# Patient Record
Sex: FEMALE | Race: WHITE | Employment: UNEMPLOYED | URBAN - METROPOLITAN AREA
[De-identification: names, ages, dates, MRNs, and addresses within clinical notes are randomized per-mention and may not be internally consistent; named-entity substitution may affect disease eponyms.]

---

## 2021-10-24 ENCOUNTER — HOSPITAL ENCOUNTER (EMERGENCY)
Age: 41
Discharge: HOME | End: 2021-10-24
Attending: EMERGENCY MEDICINE
Payer: COMMERCIAL

## 2021-10-24 ENCOUNTER — APPOINTMENT (OUTPATIENT)
Dept: ULTRASOUND IMAGING | Age: 41
End: 2021-10-24
Attending: EMERGENCY MEDICINE
Payer: COMMERCIAL

## 2021-10-24 VITALS
HEART RATE: 57 BPM | SYSTOLIC BLOOD PRESSURE: 138 MMHG | HEIGHT: 64 IN | WEIGHT: 165.34 LBS | OXYGEN SATURATION: 99 % | RESPIRATION RATE: 16 BRPM | DIASTOLIC BLOOD PRESSURE: 72 MMHG | BODY MASS INDEX: 28.23 KG/M2 | TEMPERATURE: 98.5 F

## 2021-10-24 DIAGNOSIS — M71.22 SYNOVIAL CYST OF LEFT POPLITEAL SPACE: ICD-10-CM

## 2021-10-24 DIAGNOSIS — S76.312A STRAIN OF LEFT HAMSTRING, INITIAL ENCOUNTER: Primary | ICD-10-CM

## 2021-10-24 PROCEDURE — 93971 EXTREMITY STUDY: CPT

## 2021-10-24 PROCEDURE — 99282 EMERGENCY DEPT VISIT SF MDM: CPT

## 2021-10-24 NOTE — ED PROVIDER NOTES
43-year-old female presents with knee pain and pain to her left hamstring. She states that she had a tendon injury to her knee back in June and initially got better. She received the third Covid booster and flu shot on Monday and speech began having pain and some swelling to the left knee. She has had a little bit of tingling to the left foot. She is concerned about a blood clot. She denies any chest pain or shortness of breath. She has started the  soccer and is running a lot for that. Knee Pain     Foot Pain          Past Medical History:   Diagnosis Date    Anxiety     Thyroid disease     Hypothroid       History reviewed. No pertinent surgical history. History reviewed. No pertinent family history. Social History     Socioeconomic History    Marital status:      Spouse name: Not on file    Number of children: Not on file    Years of education: Not on file    Highest education level: Not on file   Occupational History    Not on file   Tobacco Use    Smoking status: Never Smoker    Smokeless tobacco: Never Used   Substance and Sexual Activity    Alcohol use: Yes     Alcohol/week: 0.0 standard drinks     Comment: Rarely.  Drug use: No    Sexual activity: Yes     Partners: Male   Other Topics Concern    Not on file   Social History Narrative    Not on file     Social Determinants of Health     Financial Resource Strain:     Difficulty of Paying Living Expenses:    Food Insecurity:     Worried About Running Out of Food in the Last Year:     [address] in the Last Year:    Transportation Needs:     Lack of Transportation (Medical):      Lack of Transportation (Non-Medical):    Physical Activity:     Days of Exercise per Week:     Minutes of Exercise per Session:    Stress:     Feeling of Stress :    Social Connections:     Frequency of Communication with Friends and Family:     Frequency of Social Gatherings with Friends and Family:     Attends Muslim Services:     Active Member of Clubs or Organizations:     Attends Club or Organization Meetings:     Marital Status:    Intimate Partner Violence:     Fear of Current or Ex-Partner:     Emotionally Abused:     Physically Abused:     Sexually Abused: ALLERGIES: Patient has no known allergies. Review of Systems   All other systems reviewed and are negative. Vitals:    10/24/21 1257   BP: 138/72   Pulse: (!) 57   Resp: 16   Temp: 98.5 °F (36.9 °C)   SpO2: 99%   Weight: 75 kg (165 lb 5.5 oz)   Height: 5' 4\" (1.626 m)            Physical Exam  Constitutional:       Appearance: She is well-developed. HENT:      Head: Normocephalic and atraumatic. Eyes:      General: No scleral icterus. Neck:      Trachea: No tracheal deviation. Cardiovascular:      Rate and Rhythm: Normal rate. Pulmonary:      Effort: Pulmonary effort is normal. No respiratory distress. Abdominal:      General: There is no distension. Genitourinary:     Comments: deferred  Musculoskeletal:         General: Tenderness (Left hamstring tendons) present. No deformity. Cervical back: No rigidity. Comments: Minimal left knee effusion   Skin:     General: Skin is dry. Neurological:      General: No focal deficit present. Mental Status: She is alert and oriented to person, place, and time. Motor: No weakness. Psychiatric:         Mood and Affect: Mood normal.         Behavior: Behavior normal.          MDM       Procedures      3:37 PM  Patient re-evaluated. All questions answered. Patient appropriate for discharge. Given return precautions and follow up instructions. LABORATORY TESTS:  Labs Reviewed - No data to display    IMAGING RESULTS:  DUPLEX LOWER EXT VENOUS LEFT   Final Result          MEDICATIONS GIVEN:  Medications - No data to display    IMPRESSION:  1. Strain of left hamstring, initial encounter    2. Synovial cyst of left popliteal space        PLAN:  1.    Current Discharge Medication List        2. Follow-up Information     Follow up With Specialties Details Why Contact Info    [facility] [address] [facility]     Hussain Trejo MD Orthopedic Surgery Schedule an appointment as soon as possible for a visit   [address] [facility]  [phone]      [address] [facility] DEPT Emergency Medicine  If symptoms worsen or new concerns [facility] [address] [facility]  [phone]        3. Return to ED for new or worsening symptoms       Missy Deng. Laura Elder MD        Please note that this dictation was completed with [employer], the computer voice recognition software. Quite often unanticipated grammatical, syntax, homophones, and other interpretive errors are inadvertently transcribed by the computer software. Please disregard these errors. Please excuse any errors that have escaped final proofreading.

## 2021-10-24 NOTE — DISCHARGE INSTRUCTIONS
Take 600 mg of ibuprofen every 6 hours as needed for pain and swelling. Ice the knee several times a day. Elevate the left lower extremity while resting. Gradually increase activity as tolerated. Follow-up with orthopedics or your primary care doctor. Return emergency department for any new or worsening symptoms.

## 2021-10-24 NOTE — ED TRIAGE NOTES
Pt ambulates to treatment area with knee brace on left knee she states that she had tendon injury to this knee back in June and felt it got better but after 3rd covid and flu shot on Monday she began having some pain and swelling to the left knee along with some tingling to the left foot. She is concerned for a blood clot.   Denies any SOB or chest pain

## 2023-05-10 RX ORDER — FLUTICASONE PROPIONATE 50 MCG
2 SPRAY, SUSPENSION (ML) NASAL DAILY
COMMUNITY
Start: 2014-11-04

## 2023-05-10 RX ORDER — LEVOTHYROXINE SODIUM 0.12 MG/1
TABLET ORAL DAILY
COMMUNITY
Start: 2016-09-07